# Patient Record
Sex: FEMALE | Race: WHITE | NOT HISPANIC OR LATINO | Employment: UNEMPLOYED | URBAN - METROPOLITAN AREA
[De-identification: names, ages, dates, MRNs, and addresses within clinical notes are randomized per-mention and may not be internally consistent; named-entity substitution may affect disease eponyms.]

---

## 2020-01-31 ENCOUNTER — OFFICE VISIT (OUTPATIENT)
Dept: URGENT CARE | Facility: CLINIC | Age: 55
End: 2020-01-31
Payer: COMMERCIAL

## 2020-01-31 VITALS
RESPIRATION RATE: 14 BRPM | BODY MASS INDEX: 26.12 KG/M2 | HEIGHT: 64 IN | SYSTOLIC BLOOD PRESSURE: 138 MMHG | TEMPERATURE: 99 F | DIASTOLIC BLOOD PRESSURE: 90 MMHG | OXYGEN SATURATION: 98 % | HEART RATE: 80 BPM | WEIGHT: 153 LBS

## 2020-01-31 DIAGNOSIS — M31.6 TEMPORAL ARTERITIS: Primary | ICD-10-CM

## 2020-01-31 PROCEDURE — 99214 PR OFFICE/OUTPT VISIT, EST, LEVL IV, 30-39 MIN: ICD-10-PCS | Mod: S$GLB,,, | Performed by: NURSE PRACTITIONER

## 2020-01-31 PROCEDURE — 99214 OFFICE O/P EST MOD 30 MIN: CPT | Mod: S$GLB,,, | Performed by: NURSE PRACTITIONER

## 2020-01-31 RX ORDER — PREDNISONE 20 MG/1
60 TABLET ORAL DAILY
Qty: 21 TABLET | Refills: 0 | Status: SHIPPED | OUTPATIENT
Start: 2020-01-31 | End: 2020-02-07

## 2020-01-31 RX ORDER — CYCLOBENZAPRINE HCL 5 MG
5 TABLET ORAL 3 TIMES DAILY PRN
COMMUNITY

## 2020-01-31 NOTE — PROGRESS NOTES
"Subjective:       Patient ID: Trisha Eugene is a 54 y.o. female.    Vitals:  height is 5' 4" (1.626 m) and weight is 69.4 kg (153 lb). Her oral temperature is 98.8 °F (37.1 °C). Her blood pressure is 138/90 (abnormal) and her pulse is 80. Her respiration is 14 and oxygen saturation is 98%.     Chief Complaint: Otalgia (x 2 days both ears worse in the right ear)    Pt c/o pain in both ears worse on the right ear x 2 days. Also having a headache. States she has had headaches in the past but no hx of migraine. Pain 7/10, tried advil with no relief. No fever, chills, CP, SOB, hearing loss. She had a cold earlier this week and is still having some throat irritation. No visual changes.     Otalgia    There is pain in both ears. This is a new problem. The current episode started yesterday. The problem occurs constantly. The problem has been gradually worsening. There has been no fever. The pain is at a severity of 7/10. The pain is moderate. Associated symptoms include headaches. Pertinent negatives include no coughing, diarrhea, rash, sore throat or vomiting. She has tried NSAIDs for the symptoms. The treatment provided no relief.       Constitution: Negative for chills, fatigue and fever.   HENT: Positive for ear pain and tinnitus. Negative for congestion and sore throat.    Neck: Negative for painful lymph nodes.   Cardiovascular: Negative for chest pain and leg swelling.   Eyes: Negative for double vision and blurred vision.   Respiratory: Negative for cough and shortness of breath.    Gastrointestinal: Negative for nausea, vomiting and diarrhea.   Genitourinary: Negative for dysuria, frequency, urgency and history of kidney stones.   Musculoskeletal: Negative for joint pain, joint swelling, muscle cramps and muscle ache.   Skin: Negative for color change, pale, rash and bruising.   Allergic/Immunologic: Negative for seasonal allergies.   Neurological: Positive for headaches. Negative for dizziness, history of " vertigo, light-headedness and passing out.   Hematologic/Lymphatic: Negative for swollen lymph nodes.   Psychiatric/Behavioral: Negative for nervous/anxious, sleep disturbance and depression. The patient is not nervous/anxious.        Objective:      Physical Exam   Constitutional: She is oriented to person, place, and time. She appears well-developed and well-nourished. She is cooperative.  Non-toxic appearance. She does not have a sickly appearance. She does not appear ill. No distress.   HENT:   Head: Normocephalic and atraumatic.   Right Ear: Hearing, tympanic membrane and ear canal normal. Tympanic membrane is not injected, not erythematous and not bulging.   Left Ear: Hearing, tympanic membrane and ear canal normal. Tympanic membrane is not injected, not erythematous and not bulging.   Nose: Nose normal. No mucosal edema, rhinorrhea or nasal deformity. No epistaxis. Right sinus exhibits no maxillary sinus tenderness and no frontal sinus tenderness. Left sinus exhibits no maxillary sinus tenderness and no frontal sinus tenderness.   Mouth/Throat: Uvula is midline, oropharynx is clear and moist and mucous membranes are normal. No trismus in the jaw. Normal dentition. No uvula swelling. No oropharyngeal exudate, posterior oropharyngeal edema or posterior oropharyngeal erythema.   Tenderness along ear pinna and back area mastoid area, worse on R side. No mastoid redness or swelling. Bilateral temporal area exquisitely tender.   Eyes: Conjunctivae and lids are normal. No scleral icterus.   Neck: Trachea normal, normal range of motion, full passive range of motion without pain and phonation normal. Neck supple. No neck rigidity. No edema and no erythema present.   Cardiovascular: Normal rate, regular rhythm, normal heart sounds, intact distal pulses and normal pulses.   Pulmonary/Chest: Effort normal and breath sounds normal. No respiratory distress. She has no decreased breath sounds. She has no rhonchi.    Abdominal: Normal appearance.   Musculoskeletal: Normal range of motion. She exhibits no edema or deformity.   Lymphadenopathy:     She has no cervical adenopathy.   Neurological: She is alert and oriented to person, place, and time. She exhibits normal muscle tone. Coordination normal.   Skin: Skin is warm, dry, intact, not diaphoretic and not pale.   Psychiatric: She has a normal mood and affect. Her speech is normal and behavior is normal. Judgment and thought content normal. Cognition and memory are normal.   Nursing note and vitals reviewed.  .this      Assessment:       1. Temporal arteritis        Plan:         Temporal arteritis  -     predniSONE (DELTASONE) 20 MG tablet; Take 3 tablets (60 mg total) by mouth once daily. for 7 days  Dispense: 21 tablet; Refill: 0  -     Ambulatory referral to Internal Medicine         Reviewed previous pertinent office visits, PMH, PSH, terrell diaz  Concern for temporal arteritis given her age and temporal tenderness. Start prednisone and f/u with PCP. Strict ER precautions given.   Advised on return/follow-up precautions. Advised on ER precautions. Answered all patient questions. Patient verbalized understanding and voiced agreement with current treatment plan.

## 2020-01-31 NOTE — PATIENT INSTRUCTIONS
Follow up with primary care in 2-3 days  Temporal Arteritis  You have temporal arteritis (also called giant cell arteritis). This is an inflammation of the blood vessels that supply the head, neck, upper body, and arms. It can be diagnosed by examining a small piece of temporal artery, which is easily accessible. This artery is located in the scalp area just above each ear. When the arteries are inflamed, the vessel narrows, and blood flow slows down. A clot may also form inside the artery, stopping all blood flow. Reduced or blocked blood flow in the arteries that supply vital structures in the eye can cause blindness in the affected eye. In rare cases, stroke may occur.  The cause of temporal arteritis is not known. Symptoms of temporal arteritis include headache, tenderness of the temples or scalp, jaw pain when chewing, muscle aches, fatigue, fever, and unintentional weight loss.  Steroids, such as prednisone, are used to treat this condition. These reduce inflammation in the arteries. Most people begin to feel better a few days after treatment starts. Most people do recover from this condition, but it may require continued treatment for 1 or 2 years. Long-term steroid treatment can cause various serious side effects. These include osteoporosis, high blood pressure, and diabetes. Talk to your healthcare provider about side effects and ways to minimize them.  Home care  Take medicines as directed by your healthcare provider. The following suggestions will help reduce side effects from long-term steroid use.  Nutrition  · Eat plenty of fresh fruits, vegetables, and whole grains.  · Choose mostly lean sources of protein.  · Limit the use of salt, sugar, and alcohol.  · Be sure to get enough calcium and vitamin D. Low-fat dairy foods are an excellent source of these nutrients. If you are not able to eat dairy, you can choose lactose-free products instead. Here are some other foods that contain calcium:  ¨ Kale  and broccoli  ¨ White beans, green beans, and lima beans  ¨ [place]  ¨ Soybeans or tofu  ¨ Fortified juices  ¨ You can also take daily calcium supplements. Ask your healthcare provider how much calcium you should take each day.  Exercise  Get regular aerobic exercise, up to 30 minutes on most days. Exercise can help prevent bone loss, heart disease, and diabetes. It also relieves stress and can improve your mood and your overall quality of life. If you dont already exercise regularly, ask your healthcare provider for help setting up a program.  Follow-up care  Follow up with your healthcare provider, or as advised.  For more information about temporal arteritis, see:  · [employer] [place] of Arthritis and Musculoskeletal and Skin Diseases, www.niams.nih.gov  · The Arthritis Foundation, www.arthritis.org  When to seek medical advice  Call your healthcare provider right away if any of these occur:  · Worsening symptoms  · Numbness or weakness of the face, one arm, or one leg  · Slurred speech, confusion, or trouble speaking or walking  · Severe headache  · Fainting spell, dizziness, or seizure  · Pain in the chest, arm, neck, or upper back  · Sudden loss or change in vision  Date Last Reviewed: 6/14/2015  © 3094-7091 [employer]. [address] [place] [address], [place] [address]. All rights reserved. This information is not intended as a substitute for professional medical care. Always follow your healthcare professional's instructions.

## 2020-02-03 ENCOUNTER — TELEPHONE (OUTPATIENT)
Dept: URGENT CARE | Facility: CLINIC | Age: 55
End: 2020-02-03

## 2022-10-11 ENCOUNTER — HOSPITAL ENCOUNTER (EMERGENCY)
Facility: HOSPITAL | Age: 57
Discharge: HOME | End: 2022-10-11
Attending: EMERGENCY MEDICINE
Payer: COMMERCIAL

## 2022-10-11 VITALS
SYSTOLIC BLOOD PRESSURE: 128 MMHG | HEART RATE: 112 BPM | RESPIRATION RATE: 18 BRPM | BODY MASS INDEX: 26.46 KG/M2 | WEIGHT: 155 LBS | DIASTOLIC BLOOD PRESSURE: 62 MMHG | TEMPERATURE: 99 F | HEIGHT: 64 IN | OXYGEN SATURATION: 98 %

## 2022-10-11 DIAGNOSIS — S76.011A HIP STRAIN, RIGHT, INITIAL ENCOUNTER: ICD-10-CM
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
## 2022-10-11 PROCEDURE — 99283 EMERGENCY DEPT VISIT LOW MDM: CPT

## 2022-10-11 RX ORDER — DULOXETIN HYDROCHLORIDE 30 MG/1
30 CAPSULE, DELAYED RELEASE ORAL DAILY
COMMUNITY

## 2022-10-11 RX ORDER — GABAPENTIN 100 MG/1
100 CAPSULE ORAL 2 TIMES DAILY
COMMUNITY

## 2022-10-11 RX ORDER — NAPROXEN 500 MG/1
500 TABLET ORAL 2 TIMES DAILY WITH MEALS
Qty: 30 TABLET | Refills: 0 | Status: SHIPPED | OUTPATIENT
Start: 2022-10-11

## 2022-10-12 NOTE — ED TRIAGE NOTES
Pt is reporting right hip pain after a trip and fall 2 weeks ago. Hx chronic back pain. Denies numbness/tingling, bowel/bladder complaints

## 2022-10-12 NOTE — FIRST PROVIDER EVALUATION
" Emergency Department TeleTriage Encounter Note      CHIEF COMPLAINT    Chief Complaint   Patient presents with    Hip Pain     C/o right sided hip pain x 2 weeks after a fall while walking her dogs, ambulatory into triage, pt reports "PCP wasn't able to get her in the office for another two weeks and the pain is still there"       VITAL SIGNS   Initial Vitals [10/11/22 1812]   BP Pulse Resp Temp SpO2   128/62 (!) 112 18 98.7 °F (37.1 °C) 98 %      MAP       --            ALLERGIES    Review of patient's allergies indicates:  No Known Allergies    PROVIDER TRIAGE NOTE  TeleTriage Note: Trisha Eugene, a nontoxic/well appearing, 57 y.o. female, presented to the ED with c/o right hip pain for the past 2 weeks. She states she fell 2 weeks ago when she was walking her dog.     All ED beds are full at present; patient notified of this status.  Patient seen and medically screened by Nurse Practitioner via teletriage. Orders initiated at triage to expedite care.  Patient is stable to return to the waiting room and will be placed in an ED bed when available.  Care will be transferred to an alternate provider when patient has been placed in an Exam Room from the [facility] for physical exam, additional orders, and disposition.  7:00 PM Valeri Nguyen DNP, FNP-C        ORDERS  Labs Reviewed - No data to display    ED Orders (720h ago, onward)      Start Ordered     Status Ordering Provider    10/11/22 1902 10/11/22 1901  X-Ray Hip 2 or 3 views Right (with Pelvis when performed)  1 time imaging         Ordered VALERI NGUYEN              Virtual Visit Note: The provider triage portion of this emergency department evaluation and documentation was performed via [employer], a HIPAA-compliant telemedicine application, in concert with a tele-presenter in the room. A face to face patient evaluation with one of my colleagues will occur once the patient is placed in an emergency department room.      DISCLAIMER: This note was " prepared with [employer] voice recognition transcription software. Garbled syntax, mangled pronouns, and other bizarre constructions may be attributed to that software system.

## 2022-10-12 NOTE — DISCHARGE INSTRUCTIONS

## 2022-10-12 NOTE — ED PROVIDER NOTES
"Encounter Date: 10/11/2022       History     Chief Complaint   Patient presents with    Hip Pain     C/o right sided hip pain x 2 weeks after a fall while walking her dogs, ambulatory into triage, pt reports "PCP wasn't able to get her in the office for another two weeks and the pain is still there"     57-year-old female presents to ED with concern of right-sided hip pain after fall that occurred 2 weeks ago.  Patient reports she was walking her dog when she tripped on uneven surface, causing her to fall forward.  No head injury.  No LOC.  She was able to get back to standing position but has since noticed pain in to her right hip.  Pain described as sharp, worse with activities or movements, improved with rest, nonradiating, severity 8/10.  No numbness or focal weakness.  No urinary bowel incontinence or urinary/bowel complications.  No other acute complaints at this time.    The history is provided by the patient.   Review of patient's allergies indicates:  No Known Allergies  Past Medical History:   Diagnosis Date    Degenerative disc disease, cervical      Past Surgical History:   Procedure Laterality Date    BACK SURGERY  01/01/1988    BREAST SURGERY      breast reduction    CARPAL TUNNEL RELEASE  2016    left hand    TOE SURGERY  2016    left little toe    TUBAL LIGATION      1988     Family History   Problem Relation Age of Onset    Hypertension Mother     Hypertension Brother      Social History     Tobacco Use    Smoking status: Light Smoker    Smokeless tobacco: Never   Substance Use Topics    Alcohol use: No     Review of Systems   Musculoskeletal:  Positive for arthralgias.   Neurological:  Negative for weakness and numbness.     Physical Exam     Initial Vitals [10/11/22 1812]   BP Pulse Resp Temp SpO2   128/62 (!) 112 18 98.7 °F (37.1 °C) 98 %      MAP       --         Physical Exam    Nursing note and vitals reviewed.  Constitutional: She appears well-developed and well-nourished. She is active. She " does not have a sickly appearance. She does not appear ill. No distress.   HENT:   Head: Normocephalic and atraumatic.   Neck:   Normal range of motion.  Pulmonary/Chest: Effort normal.   Musculoskeletal:         General: Tenderness present.      Cervical back: Normal range of motion.      Comments: Anterior right-sided hip tenderness.  No physical or palpable deformities.  No shortening of lower extremity.  Pain is worse with forward flexion of right hip along with internal/external rotation.  Appropriate ROM, sensation and strength into RLE.  Denying lower back tenderness.     Neurological: She is alert. GCS eye subscore is 4. GCS verbal subscore is 5. GCS motor subscore is 6.   Skin: Skin is warm and dry.   Psychiatric: She has a normal mood and affect. Her speech is normal and behavior is normal.       ED Course   Procedures  Labs Reviewed - No data to display       Imaging Results              X-Ray Hip 2 or 3 views Right (with Pelvis when performed) (Final result)  Result time 10/11/22 19:41:23      Final result by Rohan Chaves MD (10/11/22 19:41:23)                   Impression:      No acute findings of the pelvis or right hip.      Electronically signed by: Rohan Chaves  Date:    10/11/2022  Time:    19:41               Narrative:    EXAMINATION:  XR HIP WITH PELVIS WHEN PERFORMED, 2 OR 3  VIEWS RIGHT    CLINICAL HISTORY:  right hip pain;    TECHNIQUE:  AP view of the pelvis and frog leg lateral view of the right hip were performed.    COMPARISON:  None    FINDINGS:  Pelvic ring is intact.  Orthopedic hardware at the lumbosacral junction is present.  There is no evidence of right hip fracture or dislocation.  Bone densities adjacent to the greater is trochanter raises the question heterotopic ossification versus synovial osteo chondroma.                                       Medications - No data to display  Medical Decision Making:   Initial Assessment:   Patient presents with concern of  right-sided hip pain that began 2 weeks ago after trip and fall while walking her dog.  No numbness or focal weakness.   has been ambulatory since.  On exam, tenderness noted over anterior right hip with pain worsened from internal/external rotation and forward flexion.  Neurovascular intact.  No palpable deformities or leg shortening/rotation  Differential Diagnosis:   Strain, sprain, contusion, dislocation, fracture  ED Management:  X-ray right hip    Imaging right hip showing no acute bony abnormalities.  Low concern for occult fracture as patient is able to fully weight bear onto her right lower extremity.  I do suspect symptoms are due to musculoskeletal strain and will plan to continue with conservative care.  Prescription for naproxen.  Encouraged ice, stretches and movements as tolerated, PCP/ortho follow-up.  ED return precautions discussed.  Patient states her understanding and agrees with plan.                        Clinical Impression:   Final diagnoses:  [W19.XXXA] Fall, initial encounter (Primary)  [S76.011A] Hip strain, right, initial encounter      ED Disposition Condition    Discharge Stable          ED Prescriptions       Medication Sig Dispense Start Date End Date Auth. Provider    naproxen (NAPROSYN) 500 MG tablet Take 1 tablet (500 mg total) by mouth 2 (two) times daily with meals. 30 tablet 10/11/2022 -- Corbin Martin PA-C          Follow-up Information       Follow up With Specialties Details Why Contact Info    Abbie Singletary MD Orthopedic Surgery   [address]  [place] [address]  [phone]               Corbin Martin PA-C  10/11/22 2002

## 2023-06-13 ENCOUNTER — TELEPHONE (OUTPATIENT)
Dept: NEUROLOGY | Facility: CLINIC | Age: 58
End: 2023-06-13
Payer: COMMERCIAL

## 2023-06-13 NOTE — TELEPHONE ENCOUNTER
LVM for the patient to offer next available appointment. Left direct contact information unable to send portal message as pt is not active in My Chart. Sent text invitation.